# Patient Record
Sex: MALE | URBAN - METROPOLITAN AREA
[De-identification: names, ages, dates, MRNs, and addresses within clinical notes are randomized per-mention and may not be internally consistent; named-entity substitution may affect disease eponyms.]

---

## 2023-07-25 NOTE — ASU PATIENT PROFILE, ADULT - FALL HARM RISK - HARM RISK INTERVENTIONS

## 2023-07-25 NOTE — ASU PATIENT PROFILE, ADULT - NSICDXPASTMEDICALHX_GEN_ALL_CORE_FT
PAST MEDICAL HISTORY:  Diabetes mellitus     H/O diabetic neuropathy     H/O sarcoidosis     HTN (hypertension)

## 2023-07-25 NOTE — ASU PATIENT PROFILE, ADULT - NS PREOP UNDERSTANDS INFO
no solids after 10pm,clears allowed up to 5am, bring ID and insurance card, no valuables or jewelry, wear comfortable clothing/yes

## 2023-07-26 ENCOUNTER — OUTPATIENT (OUTPATIENT)
Dept: OUTPATIENT SERVICES | Facility: HOSPITAL | Age: 53
LOS: 1 days | Discharge: HOME | End: 2023-07-26
Payer: MEDICAID

## 2023-07-26 VITALS
HEIGHT: 70 IN | OXYGEN SATURATION: 100 % | WEIGHT: 156.53 LBS | TEMPERATURE: 98 F | SYSTOLIC BLOOD PRESSURE: 146 MMHG | HEART RATE: 86 BPM | RESPIRATION RATE: 14 BRPM | DIASTOLIC BLOOD PRESSURE: 94 MMHG

## 2023-07-26 VITALS
TEMPERATURE: 98 F | HEART RATE: 75 BPM | DIASTOLIC BLOOD PRESSURE: 85 MMHG | SYSTOLIC BLOOD PRESSURE: 125 MMHG | OXYGEN SATURATION: 98 % | RESPIRATION RATE: 16 BRPM

## 2023-07-26 DIAGNOSIS — Z98.890 OTHER SPECIFIED POSTPROCEDURAL STATES: Chronic | ICD-10-CM

## 2023-07-26 LAB
GLUCOSE BLDC GLUCOMTR-MCNC: 101 MG/DL — HIGH (ref 70–99)
GRAM STN FLD: SIGNIFICANT CHANGE UP
SPECIMEN SOURCE: SIGNIFICANT CHANGE UP

## 2023-07-26 PROCEDURE — 88304 TISSUE EXAM BY PATHOLOGIST: CPT | Mod: 26

## 2023-07-26 RX ORDER — GABAPENTIN 400 MG/1
1 CAPSULE ORAL
Refills: 0 | DISCHARGE

## 2023-07-26 RX ORDER — FENTANYL CITRATE 50 UG/ML
25 INJECTION INTRAVENOUS
Refills: 0 | Status: DISCONTINUED | OUTPATIENT
Start: 2023-07-26 | End: 2023-07-26

## 2023-07-26 RX ORDER — METFORMIN HYDROCHLORIDE 850 MG/1
1 TABLET ORAL
Refills: 0 | DISCHARGE

## 2023-07-26 RX ORDER — PILOCARPINE HCL 4 %
1 DROPS OPHTHALMIC (EYE) ONCE
Refills: 0 | Status: DISCONTINUED | OUTPATIENT
Start: 2023-07-26 | End: 2023-07-26

## 2023-07-26 RX ORDER — LOSARTAN/HYDROCHLOROTHIAZIDE 100MG-25MG
1 TABLET ORAL
Refills: 0 | DISCHARGE

## 2023-07-26 RX ORDER — AMLODIPINE BESYLATE 2.5 MG/1
1 TABLET ORAL
Refills: 0 | DISCHARGE

## 2023-07-26 RX ORDER — ERGOCALCIFEROL 1.25 MG/1
1 CAPSULE ORAL
Refills: 0 | DISCHARGE

## 2023-07-26 NOTE — PRE-ANESTHESIA EVALUATION ADULT - NSANTHOSAYNRD_GEN_A_CORE
No. TRESSA screening performed.  STOP BANG Legend: 0-2 = LOW Risk; 3-4 = INTERMEDIATE Risk; 5-8 = HIGH Risk

## 2023-07-26 NOTE — OPERATIVE REPORT - OPERATIVE RPOSRT DETAILS
Assistant: Jesica Corbin MD    Pre-operative diagnosis: Bullous keratopathy/Fuchs/Failed graft    Post-operative diagnosis: Bullous keratopathy, left eye    Procedure: Descemet’s stripping automated endothelial keratoplasty, left eye (8 mm)    Specimen: host descemet’s and donor corneal rim    Blood loss <1 CC    Complications: None    Anesthesia: MAC and subtenons' anesthesia    After the patient was brought to the operative room, the anesthesiologist established intravenous lines, cardiac monitoring leads, and mild intravenous sedation was  administered. The surgical eye was then prepped and draped in the usual sterile fashion.    An eyelid speculum was inserted into the operative eye.  2.5 CC of 2% lidocaine and 0.75% bupivicaine were injected into the subtenons space. The decision was made to use a 8.0 mm donor punch.  The donor was retrieved from the Optisol media and punched using an 8.0mm trephine blade.  The donor button was pulled into a Tan Endoglide. Paracenteses were created at the 12, 3, and 6 o'clock positions, and Trypan blue was injected into the anterior chamber to stain Descemet's membrane.  Balanced salt solution was used to wash out the anterior chamber.  The anterior chamber was filled with viscoelastic. A clear corneal incision was created temporally using a 2.75 mm keratome.  A Reverse Sinsky hook was used to score Descemet's membrane and strip it then a keratome blade was used to enlarge the temporal incision to 4 mm.  The viscoelastic was then irrigated using bimanual IA with AC maintainer.    Attention was turned to the donor, which was placed in the operating field. The graft was pulled into the anterior chamber with tan Endoglide forceps.  BSS was used to reform the chamber.  The donor was positioned as necessary with a Reverse Sinsky hook and ballottement.  The paracentesis and corneal incisions were sutured and water-tight.  Air on a 30 gauge needle was injected at the limbus until the eye had a digital pressure of approximately 40 - 50.  It was left in this state for 10 minutes.  Air was then replaced with balanced salt solution until there was a 50% air bubble left in the eye and the pressure was normal.    Injections of antibiotics and steroids were given in the inferior fornix with the needle tip visible at all times.  The eyelid speculum was removed from the eye along with the drapes. Antiobiotic and steroid and dilating drops were placed in the eye.  The eye was patched and shielded.  The patient was taken to the recovery room in stable condition and instructed to lay flat until their appointment the following day. There were no complications.

## 2023-08-09 LAB — SURGICAL PATHOLOGY STUDY: SIGNIFICANT CHANGE UP

## 2023-08-11 LAB
CULTURE RESULTS: NO GROWTH — SIGNIFICANT CHANGE UP
SPECIMEN SOURCE: SIGNIFICANT CHANGE UP

## 2024-10-15 NOTE — ASU PATIENT PROFILE, ADULT - PATIENT KNOW
Spoke to Sariah.  Informed her a referral had been placed.  Sariah states that the patient had decided to make an appointment with the pain management doctor, before seeing a hand specialist.  Patient has an appointment on 10/29 and then decide if he wants to go to the hand specialist.   yes

## (undated) DEVICE — GLV 6.5 PROTEXIS (BLUE)

## (undated) DEVICE — CANNULA BD & CO SUBTENONS

## (undated) DEVICE — PUNCH CORNEAL BARRON 8MM

## (undated) DEVICE — DRSG STERISTRIPS 0.5 X 4"

## (undated) DEVICE — SUT NYLON 10-0 12" CU-5

## (undated) DEVICE — PACK ANTERIOR SEGMENT

## (undated) DEVICE — ENDOGLIDE ULTRATHIN

## (undated) DEVICE — FILTER SYR 22 MICRONS

## (undated) DEVICE — DRAPE MEDIUM SHEET 44" X 70"

## (undated) DEVICE — VENODYNE/SCD SLEEVE CALF MEDIUM

## (undated) DEVICE — ACM SELF RETAINING 20G

## (undated) DEVICE — KNIFE ALCON STANDARD FULL HANDLE 15 DEG (PINK)

## (undated) DEVICE — PETRI DISH MED 3.5"

## (undated) DEVICE — WARMING BLANKET LOWER ADULT

## (undated) DEVICE — KNIFE ALCON PARACENTESIS CLEARCUT SIDEPORT 1.2MM (YELLOW)

## (undated) DEVICE — NDL HYPO NONSAFE 30G X 0.5" (BEIGE)

## (undated) DEVICE — SOL IRR BAL SALT 500ML

## (undated) DEVICE — PUNCH TREPH DISP STRL 8MM

## (undated) DEVICE — SYR LUER LOK 5CC

## (undated) DEVICE — CENTURION PAK FACO ACTIVE INTREPID FMS 0.9 MM ULTRA

## (undated) DEVICE — KNIFE FULL HANDLE ANGLE 2.75MM

## (undated) DEVICE — SPEAR CELLULOSE 40410

## (undated) DEVICE — CULTURESWAB WITHOUT CHARCOAL

## (undated) DEVICE — ILM RESOLUTION FORCEP 25G DISP